# Patient Record
Sex: FEMALE | Race: WHITE
[De-identification: names, ages, dates, MRNs, and addresses within clinical notes are randomized per-mention and may not be internally consistent; named-entity substitution may affect disease eponyms.]

---

## 2017-08-01 ENCOUNTER — HOSPITAL ENCOUNTER (OUTPATIENT)
Dept: HOSPITAL 25 - SSU | Age: 32
Setting detail: OBSERVATION
LOS: 1 days | Discharge: HOME | End: 2017-08-02
Attending: SURGERY | Admitting: SURGERY
Payer: COMMERCIAL

## 2017-08-01 DIAGNOSIS — E03.9: ICD-10-CM

## 2017-08-01 DIAGNOSIS — F41.9: ICD-10-CM

## 2017-08-01 DIAGNOSIS — R10.33: Primary | ICD-10-CM

## 2017-08-01 DIAGNOSIS — F32.9: ICD-10-CM

## 2017-08-01 DIAGNOSIS — E78.5: ICD-10-CM

## 2017-08-01 DIAGNOSIS — E66.01: ICD-10-CM

## 2017-08-01 DIAGNOSIS — Z98.84: ICD-10-CM

## 2017-08-01 DIAGNOSIS — E28.2: ICD-10-CM

## 2017-08-01 LAB
ALBUMIN SERPL BCG-MCNC: 4.5 G/DL (ref 3.2–5.2)
ALP SERPL-CCNC: 68 U/L (ref 34–104)
ALT SERPL W P-5'-P-CCNC: 16 U/L (ref 7–52)
ANION GAP SERPL CALC-SCNC: 8 MMOL/L (ref 2–11)
AST SERPL-CCNC: (no result) U/L (ref 13–39)
BUN SERPL-MCNC: 13 MG/DL (ref 6–24)
BUN/CREAT SERPL: 20.3 (ref 8–20)
CALCIUM SERPL-MCNC: 9.6 MG/DL (ref 8.6–10.3)
CHLORIDE SERPL-SCNC: 103 MMOL/L (ref 101–111)
GLOBULIN SER CALC-MCNC: 3.3 G/DL (ref 2–4)
GLUCOSE SERPL-MCNC: 84 MG/DL (ref 70–100)
HCO3 SERPL-SCNC: 25 MMOL/L (ref 22–32)
HCT VFR BLD AUTO: 43 % (ref 35–47)
HGB BLD-MCNC: 14.8 G/DL (ref 12–16)
MCH RBC QN AUTO: 29 PG (ref 27–31)
MCHC RBC AUTO-ENTMCNC: 35 G/DL (ref 31–36)
MCV RBC AUTO: 85 FL (ref 80–97)
POTASSIUM SERPL-SCNC: (no result) MMOL/L (ref 3.5–5)
PROT SERPL-MCNC: 7.8 G/DL (ref 6.4–8.9)
RBC # BLD AUTO: 5.02 10^6/UL (ref 4–5.4)
SODIUM SERPL-SCNC: 136 MMOL/L (ref 133–145)
WBC # BLD AUTO: 5.5 10^3/UL (ref 3.5–10.8)

## 2017-08-01 PROCEDURE — 80053 COMPREHEN METABOLIC PANEL: CPT

## 2017-08-01 PROCEDURE — 96365 THER/PROPH/DIAG IV INF INIT: CPT

## 2017-08-01 PROCEDURE — G0378 HOSPITAL OBSERVATION PER HR: HCPCS

## 2017-08-01 PROCEDURE — 36415 COLL VENOUS BLD VENIPUNCTURE: CPT

## 2017-08-01 PROCEDURE — 74177 CT ABD & PELVIS W/CONTRAST: CPT

## 2017-08-01 PROCEDURE — 85025 COMPLETE CBC W/AUTO DIFF WBC: CPT

## 2017-08-01 PROCEDURE — 96366 THER/PROPH/DIAG IV INF ADDON: CPT

## 2017-08-01 PROCEDURE — 94760 N-INVAS EAR/PLS OXIMETRY 1: CPT

## 2017-08-01 PROCEDURE — 99156 MOD SED OTH PHYS/QHP 5/>YRS: CPT

## 2017-08-01 RX ADMIN — HYDROMORPHONE HYDROCHLORIDE PRN MG: 1 INJECTION, SOLUTION INTRAMUSCULAR; INTRAVENOUS; SUBCUTANEOUS at 17:58

## 2017-08-01 RX ADMIN — HYDROMORPHONE HYDROCHLORIDE PRN MG: 1 INJECTION, SOLUTION INTRAMUSCULAR; INTRAVENOUS; SUBCUTANEOUS at 21:27

## 2017-08-01 NOTE — RAD
INDICATION: Abdominal pain. History of gastric surgery 2013 (Pricilla-en-Y).



COMPARISON: CT January 06, 2017

 

TECHNIQUE: Axial source images were obtained from the hemidiaphragms to the symphysis

pubis following administration of oral and intravenous contrast.  120 mL Omnipaque 300 was

utilized. Coronal and sagittal reconstructed images were acquired.



Lung bases: The lung bases are clear.



Liver: The liver is mildly enlarged with findings of hepatic steatosis. There are no

masses. There is no ductal dilatation.



Gallbladder: Cholecystectomy.



Spleen: The spleen is mildly prominent, unchanged. There are no masses.



Pancreas: There is no focal pancreatic mass or ductal dilatation.



Adrenal glands: There is no evidence of adrenal mass.



Kidneys: The kidneys are normal in size and position. There are prompt nephrograms and

there is prompt excretion bilaterally. There are no renal parenchymal masses. There is no

evidence of nephrolithiasis.



Adenopathy: There is no evidence of adenopathy by size criteria.



Fluid collections: There are no free or localized fluid collections.



Vessels:There are no significant atherosclerotic changes involving the aorta. There is no

focal aneurysm. The iliac vessels are normal in caliber. The IVC appears normal.



GI tract: There is prior bariatric surgery. The patient has a history of a gastric

Pricilla-en-Y procedure. The CT appearance is unchanged. Small bowel appears normal. There are

no colonic abnormalities. There is no obstruction. The bowel gas pattern is normal.



Pelvic organs: There are fluid collections in the endometrial cavity. There is bilateral

tubal ligation. Both ovaries appear unremarkable.



Bladder: There are no bladder masses.



Abdominal and pelvic soft tissues: The extraperitoneal abdominal and pelvic soft tissues

appear normal..



Osseous structures: There are no acute osseous findings.



Other: None



IMPRESSION:



1.  Gastric Pricilla-en-Y procedure.

2.  Mild hepatosplenomegaly with hepatic steatosis

3.  Endometrial fluid collections. Suggest follow-up pelvic sonography.

## 2017-08-02 VITALS — SYSTOLIC BLOOD PRESSURE: 116 MMHG | DIASTOLIC BLOOD PRESSURE: 67 MMHG

## 2017-08-02 RX ADMIN — HYDROMORPHONE HYDROCHLORIDE PRN MG: 1 INJECTION, SOLUTION INTRAMUSCULAR; INTRAVENOUS; SUBCUTANEOUS at 13:16

## 2017-08-02 RX ADMIN — HYDROMORPHONE HYDROCHLORIDE PRN MG: 1 INJECTION, SOLUTION INTRAMUSCULAR; INTRAVENOUS; SUBCUTANEOUS at 07:30

## 2017-08-02 NOTE — PN
Progress Note





- Progress Note


Date of Service: 08/02/17


SOAP: 


Subjective:





Patient seen and examined by Dr. Cuevas. Reports feeling a little better today. 

Pain is still there, "not as bad", usually worsens when she eats. Denies nausea

, vomiting, fever or chills. Feels very hungry, would like to resume diet. 

Denies dysuria or flank pain.








Objective:





Awake and alert, comfortable and in NAD


VSS, afebrile


Lungs CTA bilat.


Heart RRR, no murmurs


Abdomen soft, non-distended. Mild orlin-umbilical and infra-umbilical tenderness 

noted, but no guarding, rigidity or rebound. Incisions from prior surgeries 

well healed. No hernias noted.


CT reviewed with Dr. Cuevas, no evidence of SBO or internal hernia. ? 

endometrial fluids collection.


Labs noted, WNL








Assessment:





A 30 y/o female, s/p RYGB 8 month ago, with abdominal pain, dysphagia and 

intermittent N/V, with negative CT and labs, likely related to possible ulcer








Plan:





EGD today, Dr. Cuevas had discussed the case with Dr. Mcfadden who will proceed 

with EGD later today.


Patient agreed to plans


Likely to d/c home later today pending EGD findings


PPI and will add Carafate upon discharge

## 2017-08-02 NOTE — HP
CC:  Dr. Noy Cho *

 

ADMISSION HISTORY AND PHYSICAL:

 

DATE OF ADMISSION:  08/01/17

 

ATTENDING SURGEON:  Dr. Lloyd Moctezuma * (DICTATED BY HYACINTH IRWIN)

 

CHIEF COMPLAINT:  Abdominal pain.

 

HISTORY OF PRESENT ILLNESS:  This is a 31-year-old female who is approximately 
8 months status post laparoscopic Pricilla-en-Y gastric bypass with Dr. Long.  
She has lost approximately 100 pounds and other than one episode of minor right 
lower quadrant crampy pain, has had otherwise uneventful postoperative course.  
Beginning 2 days ago on Sunday, she began to experience crampy abdominal pain 
in the mid abdomen.  This has been constant since then and is exacerbated by 
eating and drinking.  She has been unable to keep any solid food down and only 
able to keep some liquids down.  Pain has been located in the mid abdomen where 
it has remained since onset.  She has not had any similar episodes before.  She 
has not had any recent suspect food ingestion.  She has been continuing to pass 
flatus and has had a normal bowel movement this morning.  She denies any fever 
or chills.

 

PAST MEDICAL HISTORY:  Morbid obesity, PCOS, hypothyroidism, hyperlipidemia, 
anxiety and depression, fatty liver changes, asthma (mild).

 

PAST SURGICAL HISTORY:  Laparoscopic Pricilla-en-Y gastric bypass on 12/13/16, C- 
section x2, tubal ligation, laparoscopic cholecystectomy, NovaSure procedure 
for heavy menstrual bleeding in 2014.

 

CURRENT MEDICATIONS:

1.  Lexapro 20 mg daily.

2.  Levothyroxine 50 mcg daily.

3.  Vitamin D 2000 International Units once daily.

4.  Multivitamin once daily.

5.  Vitamin B12 1000 mcg daily.

 

ALLERGIES:  ERYTHROMYCIN and AUGMENTIN, both cause rash and difficulty breathing
; NAPROXEN causes hives (the patient does tolerate ibuprofen).

 

FAMILY HISTORY:  Positive for DVT in her father which occurred following 
amputation of one of his lower extremities.  There is no additional family 
history of VTE.  No family history of bleeding problems.

 

SOCIAL HISTORY:  The patient is .  She has 4 children.  She denies use 
of tobacco.  She drinks alcohol rarely.

 

REVIEW OF SYSTEMS:  General:  See HPI.  No other recent acute illnesses.  
Weight down approximately 100 pounds from preop.  She was seen in the office in 
June for routine bariatric followup and was doing well at that time.  Her 
bariatric profile done around 07/07/17 was reviewed and was essentially normal 
including CBC. Cardiovascular:  No history of chest pain, hypertension, or 
heart murmur. Respiratory:  No recent exacerbations of her asthma.  No cough or 
shortness of breath.  GI:  As above per HPI.  She has had colonoscopy done last 
year, which was reportedly a normal study.  EGD had also been done 
preoperatively.  :  No problems reported.  GYN:  She has a history of PCOS.  
No other additions. Neuro/Psych:  History of anxiety and depression.

 

                               PHYSICAL EXAMINATION

 

GENERAL:  Well-nourished, obese female, who appears uncomfortable, but in no 
acute distress.

 

VITAL SIGNS:  Height 68 inches, weight 199 pounds (preoperative weight 314 
pounds.) Temperature 97.3, blood pressure 104/72, pulse 72, respirations 16, 
BMI 30.

 

HEENT:  Pupils equal and round, reactive.  EOMs intact.  Conjunctivae pink. 
Oropharynx:  Teeth in good repair.  Mucous membranes dry.  No intraoral lesions.

 

NECK:  No lymphadenopathy or thyromegaly.

 

LUNGS:  Clear to auscultation.  No rales or wheezes.

 

HEART:  Regular rate and rhythm.  No murmur noted.

 

BREASTS:  Not examined.

 

ABDOMEN:  Well-healed laparoscopic incision sites.  The patient states that her 
abdomen feels bloated.  Bowel sounds are present and normoactive.  Abdomen is 
soft with mild-to-moderate tenderness limited to the mid abdomen and to the 
right of midline.  No palpable masses or organomegaly, though there is fullness 
in the mid abdomen.

 

GENITALIA AND RECTAL:  Not done.

 

BACK:  No spinous process or CVA tenderness.

 

EXTREMITIES:  No edema.

 

NEUROLOGICAL:  Grossly intact.

 

SKIN:  Warm and dry.  No suspicious rashes or lesions.

 

 IMPRESSION:  Abdominal pain status post Pricilla-en-Y gastric bypass with concern 
for internal hernia with consequential bowel obstruction.

 

PLAN:  Case was discussed with both Dr. Moctezuma (on call) and Dr. Cuevas.  The 
patient will be referred [place] [facility] [place] of the abdomen and pelvis with 
contrast.  She will then be admitted as an overnight observation for IV 
hydration and pain control.  If her symptoms persist and/or her CT scan is 
suggestive, she may be taken to the operating room for diagnostic laparoscopy 
with Dr. Cuevas (Dr. Long is away).  The patient is agreeable to this plan.

 

 ____________________________________ HYACINTH IRWIN

 

[id number]/[facility] #: [id number]

NOEL

## 2017-08-03 NOTE — DS
CC  Dr. Noy Cho *

 

DISCHARGE SUMMARY:

 

DATE OF ADMISSION:  08/01/17

 

DATE OF DISCHARGE:  08/02/17



ATTENDING PHYSICIAN:  Dr. Cuevas * (DICTATED BY HYACINTH DOS SANTOS)

 

HOSPITAL COURSE:  Please refer to admission history and physical for admission 
details.  Briefly, the patient is sent to [facility] [place] [place] after office visit with history 
of 48 hours of persistent crampy abdominal pain associated with vomiting and 
poor intake. Her admission labs were essentially normal.  CT scan with oral and 
IV contrast was normal.  There was mild hepatosplenomegaly noted with hepatic 
steatosis and also a note of endometrial fluid collection with suggested 
followup pelvic ultrasound.  As of the following morning, she was somewhat 
improved and was hungry.  She was seen by Dr. Ceuvas.  EGD was requested and 
performed with verbal report indicating no abnormalities of the upper GI tract.
  The patient will be discharged on her usual home medications.  She has a 
followup with Dr. Long in the office on 

08/07/17. She is instructed to contact our office if she has worsening symptoms 
of abdominal pain with vomiting.

 

____________________________________ HYACINTH IRWIN

 

[id number]/[facility] #: [id number]

MTDD

## 2017-08-07 ENCOUNTER — HOSPITAL ENCOUNTER (INPATIENT)
Dept: HOSPITAL 25 - SSU | Age: 32
LOS: 3 days | Discharge: HOME | DRG: 227 | End: 2017-08-10
Attending: SURGERY | Admitting: SURGERY
Payer: COMMERCIAL

## 2017-08-07 DIAGNOSIS — Z88.1: ICD-10-CM

## 2017-08-07 DIAGNOSIS — E66.9: ICD-10-CM

## 2017-08-07 DIAGNOSIS — J45.909: ICD-10-CM

## 2017-08-07 DIAGNOSIS — E03.9: ICD-10-CM

## 2017-08-07 DIAGNOSIS — K29.70: Primary | ICD-10-CM

## 2017-08-07 DIAGNOSIS — Z88.8: ICD-10-CM

## 2017-08-07 DIAGNOSIS — K46.0: ICD-10-CM

## 2017-08-07 DIAGNOSIS — R11.0: ICD-10-CM

## 2017-08-07 DIAGNOSIS — Z98.84: ICD-10-CM

## 2017-08-07 LAB
ANION GAP SERPL CALC-SCNC: 6 MMOL/L (ref 2–11)
BUN SERPL-MCNC: 11 MG/DL (ref 6–24)
BUN/CREAT SERPL: 18.3 (ref 8–20)
CALCIUM SERPL-MCNC: 9.5 MG/DL (ref 8.6–10.3)
CHLORIDE SERPL-SCNC: 104 MMOL/L (ref 101–111)
GLUCOSE SERPL-MCNC: 84 MG/DL (ref 70–100)
HCO3 SERPL-SCNC: 27 MMOL/L (ref 22–32)
HCT VFR BLD AUTO: 41 % (ref 35–47)
HGB BLD-MCNC: 13.9 G/DL (ref 12–16)
MCH RBC QN AUTO: 29 PG (ref 27–31)
MCHC RBC AUTO-ENTMCNC: 34 G/DL (ref 31–36)
MCV RBC AUTO: 85 FL (ref 80–97)
POTASSIUM SERPL-SCNC: 3.8 MMOL/L (ref 3.5–5)
RBC # BLD AUTO: 4.78 10^6/UL (ref 4–5.4)
SODIUM SERPL-SCNC: 137 MMOL/L (ref 133–145)
WBC # BLD AUTO: 4.2 10^3/UL (ref 3.5–10.8)

## 2017-08-07 PROCEDURE — 80048 BASIC METABOLIC PNL TOTAL CA: CPT

## 2017-08-07 PROCEDURE — G0378 HOSPITAL OBSERVATION PER HR: HCPCS

## 2017-08-07 PROCEDURE — 85025 COMPLETE CBC W/AUTO DIFF WBC: CPT

## 2017-08-07 PROCEDURE — 94760 N-INVAS EAR/PLS OXIMETRY 1: CPT

## 2017-08-07 PROCEDURE — 36415 COLL VENOUS BLD VENIPUNCTURE: CPT

## 2017-08-07 RX ADMIN — HYDROMORPHONE HYDROCHLORIDE PRN MG: 1 INJECTION, SOLUTION INTRAMUSCULAR; INTRAVENOUS; SUBCUTANEOUS at 11:35

## 2017-08-07 RX ADMIN — ACETAMINOPHEN PRN MG: 650 SOLUTION ORAL at 15:24

## 2017-08-07 RX ADMIN — HYDROMORPHONE HYDROCHLORIDE PRN MG: 1 INJECTION, SOLUTION INTRAMUSCULAR; INTRAVENOUS; SUBCUTANEOUS at 19:58

## 2017-08-07 RX ADMIN — PANTOPRAZOLE SODIUM SCH MG: 40 INJECTION, POWDER, FOR SOLUTION INTRAVENOUS at 11:34

## 2017-08-07 RX ADMIN — ONDANSETRON PRN MG: 2 INJECTION INTRAMUSCULAR; INTRAVENOUS at 11:35

## 2017-08-07 NOTE — PN
Progress Note





- Progress Note


Date of Service: 08/07/17


SOAP: 


Subjective:





Patient seen and examined upon her arrival. Reports same symptoms from last 

week. Nausea not improving, feeling bloated and mid-abdominal pain is not 

relived. Denies any vomiting. Her PO intake has been poor since discharge last 

week.








Objective:





Awake and alert, in NAD


VSS, afebrile


Lungs CTA bilat.


Heart RRR, no murmurs


Abdomen soft, non-distended. Mild to moderate mid-abdominal tenderness, but 

without guarding, rigidity or rebound. Incisions well healed. No hernias, 

masses or organomegally.


Ext. without edema








Assessment:





A 33 y/o female with persistent vague, mid-abdominal pain and nausea, s/p RYGB 

8 month ago, of unclear etiology.








Plan:





Admit for IVF


NPO after midnight


To OR tomorrow for diagnostic laparoscopy


PPI and DVT prophylaxis.

## 2017-08-08 PROCEDURE — 0WQF4ZZ REPAIR ABDOMINAL WALL, PERCUTANEOUS ENDOSCOPIC APPROACH: ICD-10-PCS | Performed by: SURGERY

## 2017-08-08 RX ADMIN — ONDANSETRON PRN MG: 2 INJECTION INTRAMUSCULAR; INTRAVENOUS at 20:39

## 2017-08-08 RX ADMIN — LEVOTHYROXINE SODIUM SCH MCG: 50 TABLET ORAL at 06:03

## 2017-08-08 RX ADMIN — PANTOPRAZOLE SODIUM SCH: 40 INJECTION, POWDER, FOR SOLUTION INTRAVENOUS at 13:30

## 2017-08-08 RX ADMIN — HYDROMORPHONE HYDROCHLORIDE PRN MG: 1 INJECTION, SOLUTION INTRAMUSCULAR; INTRAVENOUS; SUBCUTANEOUS at 14:11

## 2017-08-08 RX ADMIN — ACETAMINOPHEN PRN MG: 650 SOLUTION ORAL at 17:56

## 2017-08-08 RX ADMIN — ESCITALOPRAM OXALATE SCH MG: 10 TABLET ORAL at 14:11

## 2017-08-08 RX ADMIN — ACETAMINOPHEN PRN MG: 650 SOLUTION ORAL at 00:01

## 2017-08-08 RX ADMIN — HYDROMORPHONE HYDROCHLORIDE PRN MG: 1 INJECTION, SOLUTION INTRAMUSCULAR; INTRAVENOUS; SUBCUTANEOUS at 01:57

## 2017-08-08 RX ADMIN — HYDROMORPHONE HYDROCHLORIDE PRN MG: 1 INJECTION, SOLUTION INTRAMUSCULAR; INTRAVENOUS; SUBCUTANEOUS at 20:38

## 2017-08-08 NOTE — PN
Progress Note





- Progress Note


Date of Service: 08/08/17


Note: 


Brief Operative Note:





Pre-op:   Abdominal pain, nausea and vomiting, bariatric status





Post-op:   Same





Procedure:   Diagnostic laparoscopy with closure of internal hernia deficits





Surgeon:   Dr. Long





Assistant:   VINOD Malloy





Anaesthesia:   GETA





EBL:   Minimal





Fluids:   LR  700 cc





Catheters:   None





Drains:   None





specimen:   None





Findings:   See dictated op note

## 2017-08-09 RX ADMIN — PANTOPRAZOLE SODIUM SCH MG: 40 INJECTION, POWDER, FOR SOLUTION INTRAVENOUS at 09:55

## 2017-08-09 RX ADMIN — ESCITALOPRAM OXALATE SCH MG: 10 TABLET ORAL at 08:33

## 2017-08-09 RX ADMIN — HYDROCODONE BITARTRATE AND ACETAMINOPHEN PRN ML: 7.5; 325 SOLUTION ORAL at 21:12

## 2017-08-09 RX ADMIN — HYDROMORPHONE HYDROCHLORIDE PRN MG: 1 INJECTION, SOLUTION INTRAMUSCULAR; INTRAVENOUS; SUBCUTANEOUS at 05:38

## 2017-08-09 RX ADMIN — HYDROCODONE BITARTRATE AND ACETAMINOPHEN PRN ML: 7.5; 325 SOLUTION ORAL at 15:07

## 2017-08-09 RX ADMIN — ACETAMINOPHEN PRN MG: 650 SOLUTION ORAL at 23:35

## 2017-08-09 RX ADMIN — ACETAMINOPHEN PRN MG: 650 SOLUTION ORAL at 00:09

## 2017-08-09 RX ADMIN — HYDROCODONE BITARTRATE AND ACETAMINOPHEN PRN ML: 7.5; 325 SOLUTION ORAL at 10:03

## 2017-08-09 RX ADMIN — LEVOTHYROXINE SODIUM SCH MCG: 50 TABLET ORAL at 05:37

## 2017-08-09 NOTE — PN
Progress Note





- Progress Note


Date of Service: 08/09/17


SOAP: 


Subjective:


Feeling nausea, hasn't vomited.  Passing flatus and had 2 BMs.  Pain controlled 

with Dilaudid and Tylenol.  Discussed discharge expectations and she states she 

just wants to be ready to take care of her kids when she goes home.








Objective:





 Vital Signs











Temp  97.6 F   08/09/17 03:23


 


Pulse  50   08/09/17 03:23


 


Resp  16   08/09/17 06:38


 


BP  112/64   08/09/17 03:23


 


Pulse Ox  99   08/09/17 03:23





Gen: NAD


Abd: dressings c/d i; tender at incisions.


 Intake & Output











 08/08/17 08/09/17 08/09/17





 18:59 06:59 18:59


 


Intake Total 4563 3085 100


 


Output Total 1300 1450 400


 


Balance 3263 1635 -300


 


Weight 195 lb  


 


Intake:   


 


  IV Fluids 3404 1725 


 


    LR 1400  


 


  IVPB 1159  


 


    LR 1159  


 


  Oral  1360 100


 


Output:   


 


  Urine 1300 1450 400

















Assessment:


POD#1 s/p dx lap/repair internal hernia defects.  Nausea continues but no 

obvious cause, possibly gastritis in gastric remnant.








Plan:


Advance diet and change to po meds.  If able to maintain hydration po and 

tolerating po meds can likely go home later today or in AM.  Will add PPI to 

cover potential gastritis in remnant.  Zofran for nausea.

## 2017-08-09 NOTE — OP
CC:  Dr. Maru Dempsey *

 

DATE OF OPERATION:  08/08/17 - ROOM #333

 

DATE OF BIRTH:  [birth year]

 

SURGEON:  Marcin Long MD

 

ASSISTANT:  HYACINTH Jose

 

ANESTHESIOLOGIST:  Maikel Champagne MD

 

ANESTHESIA:  General endotracheal.

 

PRE-OP DIAGNOSIS:  Abdominal pain.

 

POST-OP DIAGNOSES:  Abdominal pain and internal hernia status post gastric 
bypass.

 

OPERATIVE PROCEDURE:  Diagnostic laparoscopy, closure of internal hernia 
defects.

 

ESTIMATED BLOOD LOSS:  Minimal.

 

IV FLUIDS:  Crystalloid.

 

SPECIMEN:  None.

 

DRAINS:  None.

 

COMPLICATIONS:  None.

 

COUNTS:  The instrument, needle, and sponge counts were correct.

 

DESCRIPTION OF PROCEDURE:  The patient was brought to the operating room and 
placed on the table supine.  Sequential compression devices were placed on both 
lower extremities.  General anesthesia was administered.  The abdomen was 
prepped and draped in the usual sterile fashion.  A time-out was performed.

 

Local anesthetic was infiltrated into the skin and soft tissue prior to making 
each incision.  The pneumoperitoneum was established using a Veress needle 
through a transumbilical approach and then a right upper quadrant 5-mm trocar 
was placed through previous scar.  Laparoscope was introduced and inspection 
revealed no evidence of adhesions and no evidence of obvious pathology, no 
ascites.

 

Under direct visualization, additional 5-mm trocars were placed in the 
supraumbilical midline and infraumbilically as well.

 

The inspection proceeded with identification of the cecum, which appeared 
normal. The small bowel was run proximally from the ileocecal valve to the 
jejunojejunostomy.  There was a small defect at the mesentery of the 
jejunojejunostomy; however, the bowel otherwise appeared to be quite normal 
with no evidence of inflammation.  There was some mild small bowel distention 
in the area of the mid jejunum, but no other abnormality noted.  The bowel was 
run from the jejuno-jejunostomy proximally to the ligament of Treitz and this 
appeared normal with no dilation.  The jejunum was run proximally from the 
anastomosis to the gastrojejunal anastomosis, which also appeared normal.  
There were hernia defect as expected in the retro-alimentary limb and closure 
of the hernia defects was performed with 2-0 silk in interrupted figure-of-8 
fashion tacking the mesentery of the Pricilla limb down to the anterior transverse 
colon and then the mesentery of the Pricilla limb also to the mesentery of the 
transverse colon.  A single suture was used to close the defect at the jejuno-
jejunostomy mesenteric site.  After completing these closures, the ports were 
removed under direct visualization and carbon dioxide was released.  The wounds 
were closed with 4-0 Monocryl and Steri-Strips were applied.  The patient 
tolerated the procedure well, was extubated, and transferred to recovery room 
in stable condition.

 

 [id number]/[facility] #: [id number]

[facility]

## 2017-08-10 VITALS — SYSTOLIC BLOOD PRESSURE: 109 MMHG | DIASTOLIC BLOOD PRESSURE: 59 MMHG

## 2017-08-10 RX ADMIN — LEVOTHYROXINE SODIUM SCH MCG: 50 TABLET ORAL at 05:55

## 2017-08-10 RX ADMIN — ACETAMINOPHEN PRN MG: 650 SOLUTION ORAL at 09:20

## 2017-08-10 RX ADMIN — ESCITALOPRAM OXALATE SCH MG: 10 TABLET ORAL at 09:16

## 2017-08-10 RX ADMIN — HYDROCODONE BITARTRATE AND ACETAMINOPHEN PRN ML: 7.5; 325 SOLUTION ORAL at 05:54

## 2017-08-11 NOTE — DS
CC:  Dr. Dempsey *

 

DISCHARGE SUMMARY:

 

DATE OF ADMISSION:  08/07/17

 

DATE OF DISCHARGE:  08/10/17

 

ATTENDING SURGEON:  Marcin Long MD * (DICTATED BY HYACINTH IRWIN)

 

HOSPITAL COURSE:  Please refer to admission history and physical for admission 
details.  Briefly, the patient presented with similar symptoms from 1 week 
earlier, at which time CT scan of the abdomen and pelvis and lab work were 
essentially normal.  She was taken to the operating room on 08/08/17, at which 
time she underwent diagnostic laparoscopy with closure of internal hernia 
defects (see operative report).  She has gradually been able to advance 
tolerance of diet and management of pain with oral pain medication as of the 
morning of discharge.

 

PHYSICAL EXAMINATION:  Vital Signs:  She is afebrile.  Blood pressure 109/59, 
pulse 57, respirations 12, room air saturation 98%.  Heart:  Regular rate and 
rhythm. Lungs:  Clear to auscultation.  Abdomen:  Bowel sounds present.  
Laparoscopic incision sites healing well.  No evidence of infection.  Abdomen:  
Soft with mild tenderness in the upper mid abdomen and just to the left of 
midline.

 

IMPRESSION:  Status post diagnostic laparoscopy (negative) for recurrent 
abdominal pain; possible gastritis in the stomach remnant.

 

PLAN:  Discharge on oral PPI (omeprazole 20 mg b.i.d.).  She will have a 
followup in our office on 08/17/17.

 

____________________________________ HYACINTH IRWIN

 

[id number]/[facility] #: [id number]

[facility]

## 2018-07-17 ENCOUNTER — HOSPITAL ENCOUNTER (OUTPATIENT)
Dept: HOSPITAL 25 - OR | Age: 33
Discharge: HOME | End: 2018-07-17
Attending: SURGERY
Payer: COMMERCIAL

## 2018-07-17 VITALS — SYSTOLIC BLOOD PRESSURE: 112 MMHG | DIASTOLIC BLOOD PRESSURE: 57 MMHG

## 2018-07-17 DIAGNOSIS — E03.9: ICD-10-CM

## 2018-07-17 DIAGNOSIS — F41.8: ICD-10-CM

## 2018-07-17 DIAGNOSIS — E28.2: ICD-10-CM

## 2018-07-17 DIAGNOSIS — R10.84: Primary | ICD-10-CM

## 2018-07-17 DIAGNOSIS — J45.909: ICD-10-CM

## 2018-07-17 DIAGNOSIS — N73.6: ICD-10-CM

## 2018-07-17 DIAGNOSIS — Z98.84: ICD-10-CM

## 2018-07-17 DIAGNOSIS — R19.7: ICD-10-CM

## 2018-07-17 DIAGNOSIS — E78.00: ICD-10-CM

## 2018-07-17 PROCEDURE — 49320 DIAG LAPARO SEPARATE PROC: CPT

## 2018-07-18 NOTE — OP
:  [facility]; Dr. Gifty Bradley *

 

DATE OF OPERATION:  07/17/18 - hospitals

 

DATE OF BIRTH:  [birth year]

 

SURGEON:  Marcin Long MD.

 

ASSISTANT:  None.

 

ANESTHESIOLOGIST:  Lloyd Link MD.

 

ANESTHESIA:  General endotracheal.

 

PRE-OP DIAGNOSIS:  Abdominal pain, status post gastric bypass.

 

POST-OP DIAGNOSIS:  Abdominal pain, status post gastric bypass.

 

OPERATIVE PROCEDURE:  Diagnostic laparoscopy.

 

ESTIMATED BLOOD LOSS:  Minimal.

 

IV FLUIDS:  Crystalloid.

 

SPECIMEN:  None.

 

DRAINS:  None.

 

COMPLICATIONS:  None.

 

COUNTS:  The instrument, needle, and sponge counts correct.

 

OPERATIVE FINDINGS:  Normal findings with no abnormalities of the gastric 
bypass anatomy.  There were some postoperative adhesions in the pelvis.

 

DESCRIPTION OF PROCEDURE:  The patient was brought to the operating room and 
placed on the table supine.  Sequential compression devices were placed on both 
lower extremities.  She was positioned and padded appropriately.  She was 
prepped and draped in the usual sterile fashion.  A time-out was performed.

 

Local anesthetic was infiltrated into the skin and soft tissue prior to making 
each incision.  Pneumoperitoneum was established by means of a Veress needle 
through a transumbilical approach.  After insufflating carbon dioxide to a 
pressure of 15 mmHg, a 5 mm trocar was placed infraumbilically through the 
previous scar.  A 5 mm trocar was placed infraumbilically through a previous 
scar.  A 5-mm trocar was also placed under direct visualization in the left mid 
abdomen.  Inspection of the abdominal cavity revealed no intraperitoneal 
adhesions to the anterior abdominal wall.  The liver appeared normal as did the 
falciform ligament and there was no evidence of abnormal dilation of the small 
bowel or large bowel.  Inspection proceeded from the gastrojejunal anastomosis 
distally tracing the Pricilla limb down to the jejunojejunostomy.  The 
biliopancreatic limb was inspected and it was run proximally to the ligament of 
Treitz.  It appeared normal, nondilated, and the anastomosis at the 
jejunojejunostomy appeared widely patent.  There was no mesenteric defect 
noted.  The Pricilla limb was again identified and then the jejunum was followed 
distally to the ileocecal valve.  There was no abnormality in the small bowel 
identified.  The bowel was run again proximally from the ileocecal valve to the 
jejunojejunostomy to assure that there were no missed abnormalities. Everything 
did appear normal.  The defunctionalized stomach appeared normal anteriorly 
with no dilation.  The surface underneath the liver on the right lobe appeared 
to have postoperative changes from the cholecystectomy, but otherwise normal.  
The colon appeared grossly normal through its course.  There were adhesions in 
the pelvis and this prevented visualization of the pelvis, but no adhesiolysis 
was performed.  At this point, the operation was concluded.  Ports removed, 
carbon dioxide was released.  Skin incisions were closed with 4-0 Monocryl and 
Steri-Strips applied.  The patient tolerated the procedure well, was extubated, 
and transferred to the recovery room in stable condition.

 

 [id number]/[facility] #: [id number]

[facility]

## 2024-12-15 NOTE — PRO
Problem: Respiratory  Goal: Clear secretions with interventions this shift  Outcome: Progressing  Goal: Minimize anxiety/maximize coping throughout shift  Outcome: Progressing  Goal: Minimal/no exertional discomfort or dyspnea this shift  Outcome: Progressing  Goal: No signs of respiratory distress (eg. Use of accessory muscles. Peds grunting)  Outcome: Progressing  Goal: Patent airway maintained this shift  Outcome: Progressing  Goal: Tolerate pulmonary toileting this shift  Outcome: Progressing  Goal: Verbalize decreased shortness of breath this shift  Outcome: Progressing  Goal: Wean oxygen to maintain O2 saturation per order/standard this shift  Outcome: Progressing  Goal: Increase self care and/or family involvement in next 24 hours  Outcome: Progressing      CC:  Dr. Manoj Cuevas. *

 

DATE OF PROCEDURE:  08/02/17 - ROOM #352

 

PROCEDURE:  Upper endoscopy.

 

MEDICINES:  Versed 9 mg IV,  Fentanyl 75 mcg IV.

 

NARRATIVE:  This is a 31-year-old woman who is approximately 8 months status 
post bariatric surgery for morbid obesity.  She successfully lost 8 pounds and 
had done very well.  About 3 days ago, she started to develop periumbilical pain
, which worsens after meals.  The pain has persisted and she came to the 
emergency room.  A CAT scan was generally unrevealing.  Due to these symptoms, 
upper endoscopy was recommended.

 

PROCEDURE IN DETAIL:  After the procedure was discussed with the patient, risks 
and benefits were outlined, written consent was obtained.  The patient was 
placed in the left lateral decubitus position and conscious sedation was 
administered.  A video diagnostic gastroscope was inserted orally and passed 
carefully into the esophagus.  The esophagus, gastric pouch, and jejunal limb 
were well visualized. The patient tolerated the procedure well and there were 
no immediate complications.

 

FINDINGS:  The esophagus was normal.  There was no evidence of erosive change 
or stricture.  The gastric pouch was normal, there was no inflammatory change 
or ulceration.  The gastrojejunal anastomosis was normal and widely patent.  
There was no evidence of stricture, erosion, or inflammatory change.  A couple 
of small sutures were identified.  The endoscope was passed easily through the 
anastomosis. The jejunal limb was normal as well and the limb was viewed for 
about 30 cm without any lesion identified.

 

CONCLUSION:  Normal post-bariatric upper endoscopy without any evidence of 
anastomotic ulceration, erosion, or stricture.

 

 [id number]/[facility] #: [id number]

MTDD